# Patient Record
(demographics unavailable — no encounter records)

---

## 2025-06-26 NOTE — HISTORY OF PRESENT ILLNESS
[FreeTextEntry1] : 36 y/o female, , living with spouse and 3 y/o child, presents with anxiety. Employed fulltime as general surgery residency  x10 years. 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 Grew up in [place] as only child. Lived w/ mother in a 1 bedroom apt. Father, a teacher, lived in [facility] and would visit during holidays. Reported relationship with father was confusing and so was relationship between mother and father (mom would give silent treatment). Mom worked as a CNA 7-3 when pt was growing up. Pt reported that as a teenager, father did not maintain relationship with pt, felt like a job and distant fam member to him. Would only speak with father ocassionally. Pt reported one incident w/ dad when money wasn't sent and relationship got worse. Parents would speak on an off by phone while dad was still living in [place] [facility]. Reported not talking to dad for about 9 years and during teenage years (worked several jobs- [employer], [employer]), Reported relationship with mother was tumultuous and feelings were constantly invalidated. Eventually moved out of mom's house into a basement of friends house and now lives with  and 3 y/o child. MIL passed away in 2021. Currently mother is back in life in a minimal capacity Reported current conflict with mother. Presents to therapy to process emotions and work through conflict. [FreeTextEntry2] : Reported history of depression, anxiety and therapy in the past. Noted when feeling better, stops therapy. Is navigating stressful relationships currently and wanted to reconnect with therapy. Last thereapist (Swetha FRANKS, telehealth). In the past reported diagnosis of adjustment d/o, had an ADHD eval that "said No ADHD". No psych hospitalizations or history of SI/SA.

## 2025-06-26 NOTE — DISCUSSION/SUMMARY
[FreeTextEntry1] : Will discuss therapeutic goals in initial session. [Low acute suicide risk] : Low acute suicide risk

## 2025-06-26 NOTE — PSYCHOSOCIAL ASSESSMENT
[None known] : None known [FreeTextEntry3] : Reported friendships  [FreeTextEntry1] : Family from [facility] [No] : Have you ever experienced this type of event? No [Competitive and integrated employment] : Competitive and integrated employment

## 2025-06-26 NOTE — HISTORY OF PRESENT ILLNESS
[FreeTextEntry1] : 36 y/o female, , living with spouse and 3 y/o child, presents with anxiety. Employed fulltime as general surgery residency  x10 years. 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 Grew up in [facility] as only child. Lived w/ mother in a 1 bedroom apt. Father, a teacher, lived in [facility] [place] and would visit during holidays. Reported relationship with father was confusing and so was relationship between mother and father (mom would give silent treatment). Mom worked as a CNA 7-3 when pt was growing up. Pt reported that as a teenager, father did not maintain relationship with pt, felt like a job and distant fam member to him. Would only speak with father ocassionally. Pt reported one incident w/ dad when money wasn't sent and relationship got worse. Parents would speak on an off by phone while dad was still living in [facility] [place]. Reported not talking to dad for about 9 years and during teenage years (worked several jobs- [employer], [employer]), Reported relationship with mother was tumultuous and feelings were constantly invalidated. Eventually moved out of mom's house into a basement of friends house and now lives with  and 3 y/o child. MIL passed away in 2021. Currently mother is back in life in a minimal capacity Reported current conflict with mother. Presents to therapy to process emotions and work through conflict. [FreeTextEntry2] : Reported history of depression, anxiety and therapy in the past. Noted when feeling better, stops therapy. Is navigating stressful relationships currently and wanted to reconnect with therapy. Last thereapist (Swetha FRANKS, telehealth). In the past reported diagnosis of adjustment d/o, had an ADHD eval that "said No ADHD". No psych hospitalizations or history of SI/SA.

## 2025-06-26 NOTE — PSYCHOSOCIAL ASSESSMENT
[None known] : None known [FreeTextEntry3] : Reported friendships  [FreeTextEntry1] : Family from [facility] [No] : Have you ever experienced this type of event? No [Competitive and integrated employment] : Competitive and integrated employment

## 2025-06-26 NOTE — REASON FOR VISIT
[FreeTextEntry1] : This is a 35 year old female who presents to therapy due to stress (in family relationships and at work).

## 2025-07-09 NOTE — PLAN
[Cognitive and/or Behavior Therapy] : Cognitive and/or Behavior Therapy  [Psychodynamic Therapy] : Psychodynamic Therapy  [Supportive Therapy] : Supportive Therapy [FreeTextEntry2] : -Process traumatic experiences -Increase self awareness [de-identified] : Pt was seen for session virtually and presented as A&Ox3 and engaging. Focus of session was on experience with father and on his passing. Processed grief from the loss. Explored and processed thoughts, feelings, emotions, self reflected, made connections and meaning throughout.  [Recommended Frequency of Visits: ____] : Recommended frequency of visits: [unfilled] [Return in ____ week(s)] : Return in [unfilled] week(s) [FreeTextEntry1] : LCSW and pt will continue to meet every other week for ongoing psychotherapy sessions.

## 2025-07-09 NOTE — REASON FOR VISIT
[Patient] : Patient [Patient preference] : as per patient preference [Telehealth (audio & video) - Individual/Group] : This visit was provided via telehealth using real-time 2-way audio visual technology. [Medical Office: ([facility])___] : The provider was located at the medical office in [unfilled]. [Home] : The patient, [unfilled], was located at home, [unfilled], at the time of the visit. [Verbal consent obtained from patient/other participant(s)] : Verbal consent for telehealth/telephonic services obtained from patient/other participant(s) [FreeTextEntry4] : 5:00PM [FreeTextEntry5] : 6:00PM [FreeTextEntry1] : This is a 35 year old female who presents to therapy due to stress (in family relationships and at work).